# Patient Record
Sex: MALE | Race: WHITE | NOT HISPANIC OR LATINO | Employment: UNEMPLOYED | URBAN - NONMETROPOLITAN AREA
[De-identification: names, ages, dates, MRNs, and addresses within clinical notes are randomized per-mention and may not be internally consistent; named-entity substitution may affect disease eponyms.]

---

## 2017-07-14 ENCOUNTER — HOSPITAL ENCOUNTER (EMERGENCY)
Facility: HOSPITAL | Age: 27
Discharge: STILL ADMITTED OR ADMINISTRATIVE | End: 2017-07-14
Attending: EMERGENCY MEDICINE

## 2017-07-14 ENCOUNTER — HOSPITAL ENCOUNTER (INPATIENT)
Facility: HOSPITAL | Age: 27
LOS: 4 days | Discharge: HOME | End: 2017-07-18
Attending: PSYCHIATRY & NEUROLOGY | Admitting: PSYCHIATRY & NEUROLOGY

## 2017-07-14 VITALS
TEMPERATURE: 98.6 F | SYSTOLIC BLOOD PRESSURE: 126 MMHG | BODY MASS INDEX: 40.92 KG/M2 | RESPIRATION RATE: 17 BRPM | OXYGEN SATURATION: 96 % | DIASTOLIC BLOOD PRESSURE: 79 MMHG | HEIGHT: 68 IN | WEIGHT: 270 LBS | HEART RATE: 73 BPM

## 2017-07-14 DIAGNOSIS — R45.851 SUICIDAL IDEATION: Primary | ICD-10-CM

## 2017-07-14 PROBLEM — F32.9 MDD (MAJOR DEPRESSIVE DISORDER): Status: ACTIVE | Noted: 2017-07-14

## 2017-07-14 LAB
6-ACETYL MORPHINE: NEGATIVE
ALBUMIN SERPL-MCNC: 4.7 G/DL (ref 3.5–5)
ALBUMIN/GLOB SERPL: 1.6 G/DL (ref 1.5–2.5)
ALP SERPL-CCNC: 80 U/L (ref 40–129)
ALT SERPL W P-5'-P-CCNC: 19 U/L (ref 10–44)
AMPHET+METHAMPHET UR QL: NEGATIVE
ANION GAP SERPL CALCULATED.3IONS-SCNC: 3.7 MMOL/L (ref 3.6–11.2)
AST SERPL-CCNC: 23 U/L (ref 10–34)
BARBITURATES UR QL SCN: NEGATIVE
BASOPHILS # BLD AUTO: 0.02 10*3/MM3 (ref 0–0.3)
BASOPHILS NFR BLD AUTO: 0.2 % (ref 0–2)
BENZODIAZ UR QL SCN: NEGATIVE
BILIRUB SERPL-MCNC: 1 MG/DL (ref 0.2–1.8)
BILIRUB UR QL STRIP: NEGATIVE
BUN BLD-MCNC: 16 MG/DL (ref 7–21)
BUN/CREAT SERPL: 18 (ref 7–25)
BUPRENORPHINE SERPL-MCNC: NEGATIVE NG/ML
CALCIUM SPEC-SCNC: 10 MG/DL (ref 7.7–10)
CANNABINOIDS SERPL QL: POSITIVE
CHLORIDE SERPL-SCNC: 109 MMOL/L (ref 99–112)
CLARITY UR: CLEAR
CO2 SERPL-SCNC: 27.3 MMOL/L (ref 24.3–31.9)
COCAINE UR QL: NEGATIVE
COLOR UR: ABNORMAL
CREAT BLD-MCNC: 0.89 MG/DL (ref 0.43–1.29)
DEPRECATED RDW RBC AUTO: 39.5 FL (ref 37–54)
EOSINOPHIL # BLD AUTO: 0.26 10*3/MM3 (ref 0–0.7)
EOSINOPHIL NFR BLD AUTO: 3 % (ref 0–5)
ERYTHROCYTE [DISTWIDTH] IN BLOOD BY AUTOMATED COUNT: 12.7 % (ref 11.5–14.5)
ETHANOL BLD-MCNC: <10 MG/DL
ETHANOL UR QL: <0.01 %
GFR SERPL CREATININE-BSD FRML MDRD: 103 ML/MIN/1.73
GLOBULIN UR ELPH-MCNC: 3 GM/DL
GLUCOSE BLD-MCNC: 113 MG/DL (ref 70–110)
GLUCOSE UR STRIP-MCNC: NEGATIVE MG/DL
HCT VFR BLD AUTO: 44.1 % (ref 42–52)
HGB BLD-MCNC: 15.2 G/DL (ref 14–18)
HGB UR QL STRIP.AUTO: NEGATIVE
IMM GRANULOCYTES # BLD: 0.02 10*3/MM3 (ref 0–0.03)
IMM GRANULOCYTES NFR BLD: 0.2 % (ref 0–0.5)
KETONES UR QL STRIP: ABNORMAL
LEUKOCYTE ESTERASE UR QL STRIP.AUTO: NEGATIVE
LYMPHOCYTES # BLD AUTO: 2.28 10*3/MM3 (ref 1–3)
LYMPHOCYTES NFR BLD AUTO: 25.9 % (ref 21–51)
MCH RBC QN AUTO: 29.6 PG (ref 27–33)
MCHC RBC AUTO-ENTMCNC: 34.5 G/DL (ref 33–37)
MCV RBC AUTO: 86 FL (ref 80–94)
METHADONE UR QL SCN: NEGATIVE
MONOCYTES # BLD AUTO: 0.62 10*3/MM3 (ref 0.1–0.9)
MONOCYTES NFR BLD AUTO: 7 % (ref 0–10)
NEUTROPHILS # BLD AUTO: 5.61 10*3/MM3 (ref 1.4–6.5)
NEUTROPHILS NFR BLD AUTO: 63.7 % (ref 30–70)
NITRITE UR QL STRIP: NEGATIVE
OPIATES UR QL: NEGATIVE
OSMOLALITY SERPL CALC.SUM OF ELEC: 281.4 MOSM/KG (ref 273–305)
OXYCODONE UR QL SCN: NEGATIVE
PCP UR QL SCN: NEGATIVE
PH UR STRIP.AUTO: <=5 [PH] (ref 5–8)
PLATELET # BLD AUTO: 269 10*3/MM3 (ref 130–400)
PMV BLD AUTO: 9.7 FL (ref 6–10)
POTASSIUM BLD-SCNC: 4.2 MMOL/L (ref 3.5–5.3)
PROT SERPL-MCNC: 7.7 G/DL (ref 6–8)
PROT UR QL STRIP: NEGATIVE
RBC # BLD AUTO: 5.13 10*6/MM3 (ref 4.7–6.1)
SODIUM BLD-SCNC: 140 MMOL/L (ref 135–153)
SP GR UR STRIP: >1.03 (ref 1–1.03)
UROBILINOGEN UR QL STRIP: ABNORMAL
WBC NRBC COR # BLD: 8.81 10*3/MM3 (ref 4.5–12.5)

## 2017-07-14 RX ORDER — IBUPROFEN 400 MG/1
600 TABLET ORAL EVERY 6 HOURS PRN
Status: DISCONTINUED | OUTPATIENT
Start: 2017-07-14 | End: 2017-07-18 | Stop reason: HOSPADM

## 2017-07-14 RX ORDER — ALUMINA, MAGNESIA, AND SIMETHICONE 2400; 2400; 240 MG/30ML; MG/30ML; MG/30ML
15 SUSPENSION ORAL EVERY 6 HOURS PRN
Status: DISCONTINUED | OUTPATIENT
Start: 2017-07-14 | End: 2017-07-18 | Stop reason: HOSPADM

## 2017-07-14 RX ORDER — ONDANSETRON 4 MG/1
4 TABLET, FILM COATED ORAL EVERY 6 HOURS PRN
Status: DISCONTINUED | OUTPATIENT
Start: 2017-07-14 | End: 2017-07-18 | Stop reason: HOSPADM

## 2017-07-14 RX ORDER — ECHINACEA PURPUREA EXTRACT 125 MG
2 TABLET ORAL AS NEEDED
Status: DISCONTINUED | OUTPATIENT
Start: 2017-07-14 | End: 2017-07-18 | Stop reason: HOSPADM

## 2017-07-14 RX ORDER — FAMOTIDINE 20 MG/1
20 TABLET, FILM COATED ORAL 2 TIMES DAILY PRN
Status: DISCONTINUED | OUTPATIENT
Start: 2017-07-14 | End: 2017-07-18 | Stop reason: HOSPADM

## 2017-07-14 RX ORDER — LOPERAMIDE HYDROCHLORIDE 2 MG/1
2 CAPSULE ORAL 4 TIMES DAILY PRN
Status: DISCONTINUED | OUTPATIENT
Start: 2017-07-14 | End: 2017-07-18 | Stop reason: HOSPADM

## 2017-07-14 RX ORDER — TRAZODONE HYDROCHLORIDE 50 MG/1
50 TABLET ORAL NIGHTLY PRN
Status: DISCONTINUED | OUTPATIENT
Start: 2017-07-14 | End: 2017-07-18 | Stop reason: HOSPADM

## 2017-07-14 RX ORDER — HYDROXYZINE 50 MG/1
50 TABLET, FILM COATED ORAL EVERY 6 HOURS PRN
Status: DISCONTINUED | OUTPATIENT
Start: 2017-07-14 | End: 2017-07-18 | Stop reason: HOSPADM

## 2017-07-14 RX ORDER — BENZONATATE 100 MG/1
100 CAPSULE ORAL 3 TIMES DAILY PRN
Status: DISCONTINUED | OUTPATIENT
Start: 2017-07-14 | End: 2017-07-18 | Stop reason: HOSPADM

## 2017-07-14 RX ORDER — NICOTINE 21 MG/24HR
1 PATCH, TRANSDERMAL 24 HOURS TRANSDERMAL DAILY
Status: DISCONTINUED | OUTPATIENT
Start: 2017-07-14 | End: 2017-07-18 | Stop reason: HOSPADM

## 2017-07-14 RX ADMIN — HYDROXYZINE HYDROCHLORIDE 50 MG: 50 TABLET ORAL at 21:24

## 2017-07-14 RX ADMIN — TRAZODONE HYDROCHLORIDE 50 MG: 50 TABLET ORAL at 22:47

## 2017-07-14 RX ADMIN — NICOTINE 1 PATCH: 21 PATCH TRANSDERMAL at 21:24

## 2017-07-14 NOTE — NURSING NOTE
Patient reported during assessment that he punched a wall with his fist yesterday. ED provider notified and instructed that he may want to look at his hand. No pain reported or swelling noted.

## 2017-07-14 NOTE — NURSING NOTE
Intake assessment completed. Patient reports that he has been struggling with depression for about 5 months now and it has been real bad the last two months.Her recently [place] to [place] hoping it would help but it is no better.  Patient reports that he just feels angry all the time and his mind just keeps racing with bad thgoughts like his wife is cheating on him, and crazy stuff like that. Patient says that the last two weeks it is so bad that he is not sleeping, is going off on people verbally for no reason and feels paranoid, depressed and suicidal. When asked if he had a plan the patient says all kinds of thoughts, but I really thought about getting a gun and use it. He reports that he is just so angry and does not know why. Today he decided it is effecting his family and his life at home and needs help before its too late. He also says he think he is bipolar and needs to be checked. Anxiety and depression rated a 9/10. Emotional support provided to pt.

## 2017-07-14 NOTE — NURSING NOTE
Called and provided intake information via phone. Instructed to admit the patient. Admit orders received. RBVOx2. Patient precautions maintained. Pt and ED provider made aware of plan of care.

## 2017-07-14 NOTE — NURSING NOTE
Patient to intake per ED staff.  Pockets emptied and through search complete.  Patient searched by intake nurse and witnessed (Security Dilshad) per ED Policy 100.  Belongings logged on Personal Belongings Inventory Sheet.  Patient placed in hospital attire.  Room swept for any potential safety hazards, room cleared, and patient placed in treatment room.  Patient ready for evaluation.

## 2017-07-14 NOTE — ED PROVIDER NOTES
Subjective   Patient is a 26 y.o. male presenting with mental health disorder.   History provided by:  Patient   used: No    Mental Health Problem   Presenting symptoms: agitation and suicidal thoughts    Degree of incapacity (severity):  Moderate  Onset quality:  Sudden  Duration:  1 day  Timing:  Constant  Progression:  Worsening  Chronicity:  New  Context: not drug abuse, not noncompliant and not recent medication change    Treatment compliance:  Untreated  Time since last psychoactive medication taken:  1 day  Relieved by:  Nothing  Worsened by:  Nothing  Ineffective treatments:  None tried  Associated symptoms: no abdominal pain, no anhedonia, no anxiety, no decreased need for sleep, not distractible, no feelings of worthlessness, no headaches, no hypersomnia, no irritability and no poor judgment        Review of Systems   Constitutional: Negative for irritability.   Gastrointestinal: Negative for abdominal pain.   Skin: Negative for pallor and rash.   Neurological: Negative for headaches.   Psychiatric/Behavioral: Positive for agitation and suicidal ideas. The patient is not nervous/anxious.    All other systems reviewed and are negative.      Past Medical History:   Diagnosis Date   • Anger    • Depression        No Known Allergies    Past Surgical History:   Procedure Laterality Date   • APPENDECTOMY     • CHOLECYSTECTOMY         Family History   Problem Relation Age of Onset   • Family history unknown: Yes       Social History     Social History   • Marital status: Unknown     Spouse name: N/A   • Number of children: N/A   • Years of education: N/A     Social History Main Topics   • Smoking status: Current Every Day Smoker     Packs/day: 1.00     Years: 12.00   • Smokeless tobacco: None   • Alcohol use No   • Drug use: No   • Sexual activity: Yes     Partners: Female      Comment: wife is on mirana     Other Topics Concern   • None     Social History Narrative   • None  "          Objective   Physical Exam   Constitutional: He appears well-developed and well-nourished. No distress.   HENT:   Head: Normocephalic.   Right Ear: External ear normal.   Left Ear: External ear normal.   Nose: Nose normal.   Mouth/Throat: Oropharynx is clear and moist.   Eyes: Conjunctivae and EOM are normal. Pupils are equal, round, and reactive to light. Right eye exhibits no discharge. Left eye exhibits no discharge.   Neck: Normal range of motion. Neck supple. No JVD present. No tracheal deviation present. No thyromegaly present.   Cardiovascular: Normal rate, regular rhythm, normal heart sounds and intact distal pulses.    Pulmonary/Chest: Effort normal and breath sounds normal. No stridor. No respiratory distress. He has no wheezes.   Abdominal: Soft. Bowel sounds are normal. He exhibits no distension. There is no tenderness.   Musculoskeletal: Normal range of motion.   Neurological: He is alert.   Skin: Skin is warm and dry. He is not diaphoretic.   Psychiatric: He has a normal mood and affect. His behavior is normal. Judgment and thought content normal.   Nursing note and vitals reviewed.      Procedures         ED Course  ED Course   Comment By Time   26-year-old male comes in complaining of \"suicidal ideation\"X 1 day.  Patient denies any specific plan.  Patient denies any alcohol, drug abuse.  Patient denies any other associated medical problems at this time. Enrrique Mart PA-C 07/14 1817   Endorsed to Inga Mart PA-C 07/14 1952                  MDM  Number of Diagnoses or Management Options  Suicidal ideation: new and requires workup  Risk of Complications, Morbidity, and/or Mortality  Presenting problems: moderate  Diagnostic procedures: moderate  Management options: moderate    Patient Progress  Patient progress: stable      Final diagnoses:   Suicidal ideation            Enrrique Mart PA-C  07/14/17 1959       Enrrique Mart PA-C  07/14/17 1959    "

## 2017-07-15 LAB — GLUCOSE BLDC GLUCOMTR-MCNC: 137 MG/DL (ref 70–130)

## 2017-07-15 PROCEDURE — 99223 1ST HOSP IP/OBS HIGH 75: CPT | Performed by: PSYCHIATRY & NEUROLOGY

## 2017-07-15 RX ORDER — CITALOPRAM 20 MG/1
10 TABLET ORAL DAILY
Status: DISCONTINUED | OUTPATIENT
Start: 2017-07-15 | End: 2017-07-16

## 2017-07-15 RX ADMIN — CITALOPRAM HYDROBROMIDE 10 MG: 20 TABLET ORAL at 17:11

## 2017-07-15 RX ADMIN — TRAZODONE HYDROCHLORIDE 50 MG: 50 TABLET ORAL at 21:51
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
## 2017-07-15 NOTE — PLAN OF CARE
Problem: [facility] Patient Care Overview (Adult)  Goal: Plan of Care Review  Outcome: Ongoing (interventions implemented as appropriate)  Patient very cooperative with care, out to dayroom most of this shift and patient showered. Patient reports anxiety and depression both at 6 on 0-10 scale with no thoughts to harm self noted. Patient reports he is feeling better and feels he is getting his depression under control. Patient reports missing his wife and kids and is requesting to be discharged in the morning. Patient reports good sleep and good appetite.  Will continue to monitor.    07/15/17 1503   Coping/Psychosocial Response Interventions   Plan Of Care Reviewed With patient   Coping/Psychosocial   Patient Agreement with Plan of Care agrees   Patient Care Overview   Progress improving       Goal: Interdisciplinary Rounds/Family Conference  Outcome: Ongoing (interventions implemented as appropriate)  Goal: Individualization and Mutuality  Outcome: Ongoing (interventions implemented as appropriate)  Goal: Discharge Needs Assessment  Outcome: Ongoing (interventions implemented as appropriate)    Problem:  Overarching Goals  Goal: Adheres to Safety Considerations for Self and Others  Outcome: Ongoing (interventions implemented as appropriate)  Goal: Optimized Coping Skills in Response to Life Stressors  Outcome: Ongoing (interventions implemented as appropriate)  Goal: Develops/Participates in Therapeutic [place] to Support Successful Transition  Outcome: Ongoing (interventions implemented as appropriate)

## 2017-07-15 NOTE — H&P
"Heavenly Dowd RN  Admission Date: 7/14/2017  10:06 AM 07/15/17    Kevan Lakhani, 26 y.o. Male  Subjective   \"I need help before its too late    Chief Complaint:  Increased Depression, Suicidal Ideation, Paranoia, Increased Aggression    HPI:  Kevan Lakhani is a 26 y.o. male who was admitted for complaints of Increased Depression, Suicidal Ideation, Paranoia, and Increased Aggression.  Patient states he has been experiencing feelings of depression for the past 5 months however reports these feelings has intensified over the last 2 months.  He reports increased anger and agitation and had reportedly punched a wall prior to presenting to [facility] ED for evaluation.  Prior to the patient's arrival, he decided this is effecting his family and his life and he needs help before its too late. He admits to having suicidal thoughts recently and shared he could think of all kinds of ways to kill himself.  He admits to getting a gun and \"using it\".  Patient reports racing thoughts and paranoia thinking that his wife is \"cheating on him\".  He states over the past 2 weeks his symptoms have got worse and he has been unable to sleep.  He states that he \"goes off on people\" for no reason and that he is just so angry but doesn't know why.  He reports his marriage is strained due to his anger and depression stating increased arguing with his wife.  His wife told him that if he did not get help that she would leave him.  He denies homicidal ideations or hallucinations.  He states his appetite has been good. He seems somewhat guarded and vague with some questions.  Per note, the patient moved from [place] to [place] 3 months ago and when asked why he relocated, he stated, \"just to get away\".  During my assessment, he appeared cooperative and somewhat restless.  He states he feels better than yesterday.  He also shares that he has crying spells at times. The patient had also previously stated that he believed that he is Bipolar. Screening " "questions do not reveal a history of true jaime or hypomania.  He denies any previous psychiatric hospitalizations or past diagnosis of mental illness.  The patient has been admitted to the Adult Psychiatric Unit for crisis intervention, safety, and stabilization of symptoms.   No h/o jaime or hypomania based on extensive screening questions posed during this interview today.    Past Psych History: He denies any previous psychiatric hospitalizations, outpatient treatment, or past suicide attempts.      Substance Abuse: UDS is negative.  He denies any illicit drug or alcohol use and smokes 1 pack of cigarettes per day.    Family History:  Family history is unknown by patient.    Personal and social history:  Born and raised in [place], the patient reports recently moving to [place] 3 months ago \"just to get away\".   He has an 11th grade education, is unemployed, and reports he receives disability due to a learning disability.  The patient has been  for the past 7 years and has 2 children, ages 5 and 2.  He denies any history of abuse as a child.  He denies any prior arrests or current legal issues.    Medical/Surgical History:  Denies history of seizures in the past.    Past Medical History:   Diagnosis Date   • Anger    • Depression      Past Surgical History:   Procedure Laterality Date   • APPENDECTOMY     • CHOLECYSTECTOMY         No Known Allergies  Social History   Substance Use Topics   • Smoking status: Current Every Day Smoker     Packs/day: 1.00     Years: 12.00   • Smokeless tobacco: None   • Alcohol use No     Current Medications:   Current Facility-Administered Medications   Medication Dose Route Frequency Provider Last Rate Last Dose   • aluminum-magnesium hydroxide-simethicone (MAALOX MAX) 400-400-40 MG/5ML suspension 15 mL  15 mL Oral Q6H PRN Mayco Degroot MD       • benzonatate (TESSALON) capsule 100 mg  100 mg Oral TID PRN Mayco Degroot MD       • famotidine (PEPCID) tablet 20 mg  20 mg " Oral BID PRN Mayco Degroot MD       • hydrOXYzine (ATARAX) tablet 50 mg  50 mg Oral Q6H PRN Mayco Degroot MD   50 mg at 07/14/17 2124   • ibuprofen (ADVIL,MOTRIN) tablet 600 mg  600 mg Oral Q6H PRN Mayco Degroot MD       • loperamide (IMODIUM) capsule 2 mg  2 mg Oral 4x Daily PRN Mayco Degroot MD       • magnesium hydroxide (MILK OF MAGNESIA) suspension 2400 mg/10mL 10 mL  10 mL Oral Daily PRN Mayco Degroot MD       • nicotine (NICODERM CQ) 21 MG/24HR patch 1 patch  1 patch Transdermal Daily Mayco Degroot MD   1 patch at 07/14/17 2124   • ondansetron (ZOFRAN) tablet 4 mg  4 mg Oral Q6H PRN Mayco Degroot MD       • sodium chloride (OCEAN) nasal spray 2 spray  2 spray Each Nare PRN Mayco Degroot MD       • traZODone (DESYREL) tablet 50 mg  50 mg Oral Nightly PRN Mayco Degroot MD   50 mg at 07/14/17 2247       Review of Systems    Review of Systems - General ROS: negative for - chills, fever or malaise  Ophthalmic ROS: negative for - loss of vision  ENT ROS: negative for - hearing change  Allergy and Immunology ROS: negative for - hives  Hematological and Lymphatic ROS: negative for - bleeding problems  Endocrine ROS: negative for - skin changes  Respiratory ROS: no cough, shortness of breath, or wheezing  Cardiovascular ROS: no chest pain or dyspnea on exertion  Gastrointestinal ROS: no abdominal pain, change in bowel habits, or black or bloody stools  Genito-Urinary ROS: no dysuria, trouble voiding, or hematuria  Musculoskeletal ROS: negative for - gait disturbance  Neurological ROS: no TIA or stroke symptoms  Dermatological ROS: negative for rash    Objective       General Appearance:    Alert, cooperative, in no acute distress   Head:    Normocephalic, without obvious abnormality, atraumatic   Eyes:            Lids and lashes normal, conjunctivae and sclerae normal, no   icterus, no pallor, corneas clear, PERRLA   Ears:    Ears appear intact with no abnormalities noted   Throat:   No oral  "lesions, no thrush, oral mucosa moist   Neck:   No adenopathy, supple, trachea midline, no thyromegaly, no   carotid bruit, no JVD   Back:     No kyphosis present, no scoliosis present, no skin lesions,      erythema or scars, no tenderness to percussion or                   palpation,   range of motion normal   Lungs:     Clear to auscultation,respirations regular, even and                  unlabored    Heart:    Regular rhythm and normal rate, normal S1 and S2, no            murmur, no gallop, no rub, no click   Chest Wall:    No abnormalities observed   Abdomen:     Normal bowel sounds, no masses, no organomegaly, soft        non-tender, non-distended, no guarding, no rebound                tenderness   Rectal:     Deferred   Extremities:   Moves all extremities well, no edema, no cyanosis, no             redness   Pulses:   Pulses palpable and equal bilaterally   Skin:   No bleeding, bruising or rash   Lymph nodes:   No palpable adenopathy   Neurologic:   Cranial nerves 2 - 12 grossly intact, sensation intact, DTR       present and equal bilaterally       Blood pressure 123/82, pulse 52, temperature 97.7 °F (36.5 °C), temperature source Axillary, resp. rate 18, height 68\" (172.7 cm), weight 271 lb 6.4 oz (123 kg), SpO2 97 %.    Mental Status Exam:   Hygiene:   fair  Cooperation:  Guarded  Eye Contact:  Fair  Psychomotor Behavior:  Restless  Affect:  Restricted  Hopelessness: 8  Speech:  Minimal and Pressured  Thought Process:  Linear and Pressured  Thought Content:  Mood congurent  Suicidal:  Suicidal Ideation  Homicidal:  None  Hallucinations:  None  Delusion:  Paranoid  Memory:  Intact  Orientation:  Person, Place and Situation  Reliability:  poor  Insight:  Fair  Judgement:  Poor  Impulse Control:  Poor  Physical/Medical Issues:  No     Medical Decision Making:              Labs:       Results for AMIE CANDELARIA (MRN [id number]) as of 7/15/2017 10:07   Ref. Range 7/14/2017 17:55 7/14/2017 17:59   Glucose " Latest Ref Range: 70 - 110 mg/dL 113 (H)    Sodium Latest Ref Range: 135 - 153 mmol/L 140    Potassium Latest Ref Range: 3.5 - 5.3 mmol/L 4.2    CO2 Latest Ref Range: 24.3 - 31.9 mmol/L 27.3    Chloride Latest Ref Range: 99 - 112 mmol/L 109    Anion Gap Latest Ref Range: 3.6 - 11.2 mmol/L 3.7    Creatinine Latest Ref Range: 0.43 - 1.29 mg/dL 0.89    BUN Latest Ref Range: 7 - 21 mg/dL 16    BUN/Creatinine Ratio Latest Ref Range: 7.0 - 25.0  18.0    Calcium Latest Ref Range: 7.7 - 10.0 mg/dL 10.0    eGFR Non African Amer Latest Ref Range: >60 mL/min/1.73 103    Alkaline Phosphatase Latest Ref Range: 40 - 129 U/L 80    Total Protein Latest Ref Range: 6.0 - 8.0 g/dL 7.7    ALT (SGPT) Latest Ref Range: 10 - 44 U/L 19    AST (SGOT) Latest Ref Range: 10 - 34 U/L 23    Total Bilirubin Latest Ref Range: 0.2 - 1.8 mg/dL 1.0    Albumin Latest Ref Range: 3.50 - 5.00 g/dL 4.70    Globulin Latest Units: gm/dL 3.0    A/G Ratio Latest Ref Range: 1.5 - 2.5 g/dL 1.6    WBC Latest Ref Range: 4.50 - 12.50 10*3/mm3 8.81    RBC Latest Ref Range: 4.70 - 6.10 10*6/mm3 5.13    Hemoglobin Latest Ref Range: 14.0 - 18.0 g/dL 15.2    Hematocrit Latest Ref Range: 42.0 - 52.0 % 44.1    RDW Latest Ref Range: 11.5 - 14.5 % 12.7    MCV Latest Ref Range: 80.0 - 94.0 fL 86.0    MCH Latest Ref Range: 27.0 - 33.0 pg 29.6    MCHC Latest Ref Range: 33.0 - 37.0 g/dL 34.5    MPV Latest Ref Range: 6.0 - 10.0 fL 9.7    Platelets Latest Ref Range: 130 - 400 10*3/mm3 269    RDW-SD Latest Ref Range: 37.0 - 54.0 fl 39.5    Neutrophil % Latest Ref Range: 30.0 - 70.0 % 63.7    Lymphocyte % Latest Ref Range: 21.0 - 51.0 % 25.9    Monocyte % Latest Ref Range: 0.0 - 10.0 % 7.0    Eosinophil % Latest Ref Range: 0.0 - 5.0 % 3.0    Basophil % Latest Ref Range: 0.0 - 2.0 % 0.2    Immature Grans % Latest Ref Range: 0.0 - 0.5 % 0.2    Neutrophils, Absolute Latest Ref Range: 1.40 - 6.50 10*3/mm3 5.61    Lymphocytes, Absolute Latest Ref Range: 1.00 - 3.00 10*3/mm3 2.28     Monocytes, Absolute Latest Ref Range: 0.10 - 0.90 10*3/mm3 0.62    Eosinophils, Absolute Latest Ref Range: 0.00 - 0.70 10*3/mm3 0.26    Basophils, Absolute Latest Ref Range: 0.00 - 0.30 10*3/mm3 0.02    Immature Grans, Absolute Latest Ref Range: 0.00 - 0.03 10*3/mm3 0.02    Color, UA Latest Ref Range: Yellow, Straw   Dark Yellow (A)   Appearance, UA Latest Ref Range: Clear   Clear   Specific Sunflower, UA Latest Ref Range: 1.005 - 1.030   >1.030 (H)   pH, UA Latest Ref Range: 5.0 - 8.0   <=5.0   Glucose, UA Latest Ref Range: Negative   Negative   Ketones, UA Latest Ref Range: Negative   Trace (A)   Blood, UA Latest Ref Range: Negative   Negative   Nitrite, UA Latest Ref Range: Negative   Negative   Leuk Esterase, UA Latest Ref Range: Negative   Negative   Protein, UA Latest Ref Range: Negative   Negative   Bilirubin, UA Latest Ref Range: Negative   Negative   Urobilinogen, UA Latest Ref Range: 0.2 - 1.0 E.U./dL   0.2 E.U./dL   Ethanol % Latest Units: % <0.010    Ethanol Latest Ref Range: <=10 mg/dL <10    Amphetamine Screen, Urine Latest Ref Range: Negative   Negative   Barbiturates Screen, Urine Latest Ref Range: Negative   Negative   Benzodiazepine Screen, Urine Latest Ref Range: Negative   Negative   Buprenorphine, Screen, Urine Latest Ref Range: Negative   Negative   Cocaine Screen, Urine Latest Ref Range: Negative   Negative   Methadone Screen , Urine Latest Ref Range: Negative   Negative   Opiate Screen, Urine Latest Ref Range: Negative   Negative   Oxycodone Screen, Urine Latest Ref Range: Negative   Negative   Phencyclidine (PCP), Urine Latest Ref Range: Negative   Negative   THC Screen, Urine Latest Ref Range: Negative   Positive (A)   6-ACETYL MORPHINE Latest Ref Range: Negative   Negative   Osmolality Calc Latest Ref Range: 273.0 - 305.0 mOsm/kg 281.4               Medications:                nicotine 1 patch Transdermal Daily                  •  aluminum-magnesium hydroxide-simethicone  •  benzonatate  •   famotidine  •  hydrOXYzine  •  ibuprofen  •  loperamide  •  magnesium hydroxide  •  ondansetron  •  sodium chloride  •  traZODone   All medications reviewed.    Special Precautions: Continue current level of Special Precautions.            Assessment/Plan    Monitor and treat in a safe and secure environment.       Patient Active Problem List   Diagnosis Code   • MDD (major depressive disorder) F32.9   Rule Out: OCD, EARLE - I suspect he could be struggling with OCD or EARLE based on his reporting today.  Deserves further investigation and treatment long-term.    The patient has been admitted to the [facility] for safety and symptom stabilization. The patient has been given routine orders and placed on special precautions. The patient will be assigned a Master Level Therapist. Dr. GARRET Degroot will assess the patient daily and work with the treatment team to develop a plan of care.       Discussed risks, benefits, and side effects of the above medication and the patient was agreeable with the plan.             Attestation:  I, Heavenly Dowd RN acted as scribe for Dr. GARRET Degroot.                Physician Attestation: I attest that the above note accurately reflects work and decisions made by me.

## 2017-07-15 NOTE — PLAN OF CARE
Problem: BH Patient Care Overview (Adult)  Goal: Plan of Care Review  Outcome: Ongoing (interventions implemented as appropriate)    07/14/17 9916   Coping/Psychosocial Response Interventions   Plan Of Care Reviewed With patient   Coping/Psychosocial   Patient Agreement with Plan of Care agrees   Patient Care Overview   Progress no change   Outcome Evaluation   Outcome Summary/Follow up Plan New Admit
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
## 2017-07-15 NOTE — PROGRESS NOTES
Attempted to contact the patient's mother, Melisa, at telephone numbers [phone] and [phone].  She was not reachable at either of these numbers, and a message was left at each number requesting callback.  The purpose of the therapist call was to gather collateral information and facilitating safety planning.  Depending on the results of this conversation, the patient might be able to return home as early as tomorrow.  The therapist also hoped to speak with the patient's spouse, Sherrie, at this time.

## 2017-07-16 PROCEDURE — 99232 SBSQ HOSP IP/OBS MODERATE 35: CPT | Performed by: PSYCHIATRY & NEUROLOGY

## 2017-07-16 RX ORDER — CITALOPRAM 20 MG/1
20 TABLET ORAL DAILY
Status: DISCONTINUED | OUTPATIENT
Start: 2017-07-17 | End: 2017-07-18 | Stop reason: HOSPADM

## 2017-07-16 RX ADMIN — HYDROXYZINE HYDROCHLORIDE 50 MG: 50 TABLET ORAL at 20:56

## 2017-07-16 RX ADMIN — CITALOPRAM HYDROBROMIDE 10 MG: 20 TABLET ORAL at 08:30

## 2017-07-16 RX ADMIN — TRAZODONE HYDROCHLORIDE 50 MG: 50 TABLET ORAL at 22:01

## 2017-07-16 NOTE — PLAN OF CARE
Problem:  Patient Care Overview (Adult)  Goal: Plan of Care Review  Outcome: Ongoing (interventions implemented as appropriate)    07/16/17 0335   Coping/Psychosocial Response Interventions   Plan Of Care Reviewed With patient   Coping/Psychosocial   Patient Agreement with Plan of Care agrees   Patient Care Overview   Progress improving   Outcome Evaluation   Outcome Summary/Follow up Plan Pt calm and cooperative this hsift. Rates anxiety and depression both 1/10. Denies SI/HI and SUSAN. Pt states he is feeling much better.

## 2017-07-16 NOTE — PLAN OF CARE
Problem:  Patient Care Overview (Adult)  Goal: Plan of Care Review    07/16/17 1801   Coping/Psychosocial Response Interventions   Plan Of Care Reviewed With patient   Coping/Psychosocial   Patient Agreement with Plan of Care agrees   Patient Care Overview   Progress improving   Outcome Evaluation   Outcome Summary/Follow up Plan Pt has no c/o, denies SI/HI and hallucinations. Reports he's waiting on the doctor to see if he gets to go home today. Pt reports sleep and appetite good and meds helping. Pt somewhat interactive with peers-talkative and watches tv with a male peer on the unit. Pt angry and cursing after seein the doctor, requesting to call his mom. Pt reports his mom lied about him having a gun and trying to kill himself. Pt was able to talk to Alanis the therapist, and calmed down.

## 2017-07-16 NOTE — PROGRESS NOTES
"      PROGRESS NOTE  Clinician: Mayco Degroot MD  Admission Date: 7/14/2017  1:03 PM 07/16/17    Behavioral Health Treatment Plan and Problem List: I have reviewed and approved the Behavioral Health Treatment Plan and Problem list.    Allergies  No Known Allergies    Hospital Day: 2 days      Assessment completed within view of staff    History  CC: inpatient followup  Interval HPI: Patient seen and evaluated by me.  Chart reviewed.  I discussed the contents of therapist's note (collateral obtained from patient's mother) with patient - he denied and minimized most of the events, demonstrating poor insight into the seriousness of his suicidal threats and suicidal ideation.  ROS negative as below.  Tolerating Celexa okay.    Interval Review of Systems:   General ROS: negative for - fever or malaise  Endocrine ROS: negative for - palpitations  Respiratory ROS: no cough, shortness of breath, or wheezing  Cardiovascular ROS: no chest pain or dyspnea on exertion  Gastrointestinal ROS: no abdominal pain,no black or bloody stools    /77 (BP Location: Right arm, Patient Position: Lying)  Pulse 50  Temp 97.6 °F (36.4 °C) (Temporal Artery )   Resp 20  Ht 68\" (172.7 cm)  Wt 271 lb 6.4 oz (123 kg)  SpO2 99%  BMI 41.27 kg/m2    Mental Status Exam  Mood/Affect: evasive  Thought Processes:  evasive  Thought Content:  normal  Suicidal Thoughts:  denies  Suicidal Plan/Intent: denies  Homicidal Thoughts:  absent        Medical Decision Making:   Labs:     Lab Results (last 24 hours)     ** No results found for the last 24 hours. **            Radiology:     Imaging Results (last 24 hours)     ** No results found for the last 24 hours. **            EKG:     ECG/EMG Results (most recent)     None           Medications:     citalopram 10 mg Oral Daily   nicotine 1 patch Transdermal Daily          All medications reviewed.    Special Precautions: Continue current level of Special Precautions.    Assessment/Diagnosis: "   Patient Active Problem List   Diagnosis Code   • MDD (major depressive disorder) F32.9     Treatment Plan: Continue current treatment plan.Continue hospitalization for safety.  Increase celexa to 20mg daily.    Attestation:   Physician Attestation: I attest that the above note accurately reflects work and decisions made by me.

## 2017-07-17 PROCEDURE — 99232 SBSQ HOSP IP/OBS MODERATE 35: CPT | Performed by: PSYCHIATRY & NEUROLOGY

## 2017-07-17 RX ADMIN — CITALOPRAM HYDROBROMIDE 20 MG: 20 TABLET ORAL at 08:32

## 2017-07-17 NOTE — PLAN OF CARE
Problem:  Patient Care Overview (Adult)  Goal: Interdisciplinary Rounds/Family Conference  Outcome: Ongoing (interventions implemented as appropriate)    07/17/17 8399   Interdisciplinary Rounds/Family Conf   Summary Individual session    Participants patient;social work      D: Therapist met with patient on this date for individual to discuss patient needs and treatment progress.  Patient reports overall improvement in symptoms that led to hospitalization.  Reports having history of anger management issues and low self-esteem.  He reports he was hopeful for discharge home today but understands Dr. Degroot wanted him to stay until tomorrow.  He discussed being eager to see his 2 sons and his wife for discharge.  He reports he hopes he has not [place] repairable damaged his relationship with his wife to work on as well.  He discussed he felt he needed outpatient treatment with a therapist for several years and is agreeable to follow-up with [facility] for outpatient treatment at discharge.  Patient signed consent.  Patient expressed he hopes to finish his GED soon and get a good job.  Expressed he is on SSI since a child but that is not enough income for them to live on and wants to make more money.     A: Patient mood and affect were euthymic.  Patient denies SI/HI/AVH.     P: Patient remains hospitalized for safety and stabilization.  Patient has agreed to follow up with [facility] and signed consent.  Patient will likely discharge tomorrow.

## 2017-07-17 NOTE — PROGRESS NOTES
"      PROGRESS NOTE  Clinician: Mayco Degroot MD  Admission Date: 7/14/2017  9:25 AM 07/17/17    Behavioral Health Treatment Plan and Problem List: I have reviewed and approved the Behavioral Health Treatment Plan and Problem list.    Allergies  No Known Allergies    Hospital Day: 3 days      Assessment completed within view of staff    History  CC: inpatient followup  Interval HPI: Patient seen and evaluated by me.  Chart reviewed.  He denies SI today.  Tolerating medications okay.  ROS negative as below.  He opened up to me today for the first time.  He stated that his behaviors in the days prior to admission were primarily driven out of a need for attention from his family members and his own difficulty regulating his anger.  He says that he was very upset at himself for creating a situation in which his wife wanted to leave him.  He says he's been verbally abusive to his wife, and because of his anxiety, has constantly been accusing her of cheating on him.  He's also been very angry towards her and angry in general.  It all came to head apparently on Thursday when she said that she was going to leave him.  Says that that is what generated his impulsive behaviors that led to this admission.    Interval Review of Systems:   General ROS: negative for - fever or malaise  Endocrine ROS: negative for - palpitations  Respiratory ROS: no cough, shortness of breath, or wheezing  Cardiovascular ROS: no chest pain or dyspnea on exertion  Gastrointestinal ROS: no abdominal pain,no black or bloody stools    /70 (BP Location: Right arm, Patient Position: Lying)  Pulse 57  Temp 97.9 °F (36.6 °C) (Temporal Artery )   Resp 20  Ht 68\" (172.7 cm)  Wt 271 lb 6.4 oz (123 kg)  SpO2 99%  BMI 41.27 kg/m2    Mental Status Exam  Mood/Affect:  \"I feel okay\"  Thought Processes:  linear, logical, and goal directed  Thought Content:  normal  Suicidal Thoughts:  denies  Suicidal Plan/Intent: none  Homicidal Thoughts:  " absent        Medical Decision Making:   Labs:     Lab Results (last 24 hours)     ** No results found for the last 24 hours. **            Radiology:     Imaging Results (last 24 hours)     ** No results found for the last 24 hours. **            EKG:     ECG/EMG Results (most recent)     None           Medications:     citalopram 20 mg Oral Daily   nicotine 1 patch Transdermal Daily          All medications reviewed.    Special Precautions: Continue current level of Special Precautions.    Assessment/Diagnosis:   Patient Active Problem List   Diagnosis Code   • MDD (major depressive disorder) F32.9     Treatment Plan: Continue current treatment plan.  Patient has been pushing to leave AGAINST MEDICAL ADVICE.  I am not comfortable with this until we have a clear plan for disposition and follow-up care.  It was only just today that he began to open up and develop some insight into his behaviors.  We'll work on developing plans for follow-up care with the therapist today.  Monitor for stability on the unit.  Consider possible discharge in the morning if he continues to demonstrate lack of suicidal ideation.    Attestation:   Physician Attestation: I attest that the above note accurately reflects work and decisions made by me.

## 2017-07-17 NOTE — PLAN OF CARE
Problem:  Patient Care Overview (Adult)  Goal: Plan of Care Review  Outcome: Ongoing (interventions implemented as appropriate)    07/17/17 1535   Coping/Psychosocial Response Interventions   Plan Of Care Reviewed With patient   Coping/Psychosocial   Patient Agreement with Plan of Care agrees   Patient Care Overview   Progress improving   Outcome Evaluation   Outcome Summary/Follow up Plan THE CLIENT DENIED ANXIETY, DEPRESSION, HI, SI, AND HALLUCINATIONS DURING ASSESSMENT. NO OUTBURSTS OR BEHAVIOR ISSUES DURING THIS SHIFT, HE INTERACTS APPROPRIATELY WITH STAFF AND PEERS.         Problem:  Overarching Goals  Goal: Adheres to Safety Considerations for Self and Others  Outcome: Ongoing (interventions implemented as appropriate)  Goal: Optimized Coping Skills in Response to Life Stressors  Outcome: Ongoing (interventions implemented as appropriate)  Goal: Develops/Participates in Therapeutic [place] to Support Successful Transition  Outcome: Ongoing (interventions implemented as appropriate)

## 2017-07-17 NOTE — PLAN OF CARE
Problem:  Patient Care Overview (Adult)  Goal: Plan of Care Review  Outcome: Ongoing (interventions implemented as appropriate)    07/17/17 0454   Coping/Psychosocial Response Interventions   Plan Of Care Reviewed With patient   Coping/Psychosocial   Patient Agreement with Plan of Care agrees   Patient Care Overview   Progress improving   Outcome Evaluation   Outcome Summary/Follow up Plan Pt calm and cooperative this shift. Denies anxiety and depression. Denies SI/HI and SUSAN. Pt states he is feeling much better and is ready for discharge.

## 2017-07-18 VITALS
BODY MASS INDEX: 41.13 KG/M2 | TEMPERATURE: 98 F | DIASTOLIC BLOOD PRESSURE: 79 MMHG | HEIGHT: 68 IN | WEIGHT: 271.4 LBS | SYSTOLIC BLOOD PRESSURE: 122 MMHG | RESPIRATION RATE: 18 BRPM | OXYGEN SATURATION: 94 % | HEART RATE: 52 BPM

## 2017-07-18 PROCEDURE — 99238 HOSP IP/OBS DSCHRG MGMT 30/<: CPT | Performed by: PSYCHIATRY & NEUROLOGY

## 2017-07-18 RX ORDER — TRAZODONE HYDROCHLORIDE 50 MG/1
50 TABLET ORAL NIGHTLY PRN
Qty: 30 TABLET | Refills: 0 | Status: SHIPPED | OUTPATIENT
Start: 2017-07-18

## 2017-07-18 RX ORDER — CITALOPRAM 20 MG/1
20 TABLET ORAL DAILY
Qty: 30 TABLET | Refills: 0 | Status: SHIPPED | OUTPATIENT
Start: 2017-07-18

## 2017-07-18 RX ADMIN — CITALOPRAM HYDROBROMIDE 20 MG: 20 TABLET ORAL at 08:39

## 2017-07-18 RX ADMIN — NICOTINE 1 PATCH: 21 PATCH TRANSDERMAL at 08:38

## 2017-07-18 NOTE — PROGRESS NOTES
"      PROGRESS NOTE  Clinician: Mayco Degroot MD  Admission Date: 7/14/2017  8:59 AM 07/18/17    Behavioral Health Treatment Plan and Problem List: I have reviewed and approved the Behavioral Health Treatment Plan and Problem list.    Allergies  No Known Allergies    Hospital Day: 4 days      Assessment completed within view of staff    History  CC: inpatient followup  Interval HPI: Patient seen and evaluated by me.  Chart reviewed. He has responded to treatment.  Discussing his problems openly now and demonstrating better insight. Therapist has made arrangements for followup care. He is feeling well this morning.  Denies SI.  No issues reported by staff overnight. Interacting with staff and peers appropriately.  Demonstrating good control of affect and impulses on the unit.  He is requesting to go home.    Interval Review of Systems:   General ROS: negative for - fever or malaise  Endocrine ROS: negative for - palpitations  Respiratory ROS: no cough, shortness of breath, or wheezing  Cardiovascular ROS: no chest pain or dyspnea on exertion  Gastrointestinal ROS: no abdominal pain,no black or bloody stools    /79 (BP Location: Left arm, Patient Position: Lying)  Pulse 52  Temp 98 °F (36.7 °C) (Temporal Artery )   Resp 18  Ht 68\" (172.7 cm)  Wt 271 lb 6.4 oz (123 kg)  SpO2 94%  BMI 41.27 kg/m2    Mental Status Exam  Mood/Affect: euthymic  Thought Processes:  linear, logical, and goal directed  Thought Content:  normal  Suicidal Thoughts:  none  Suicidal Plan/Intent: none  Homicidal Thoughts:  absent        Medical Decision Making:   Labs:     Lab Results (last 24 hours)     ** No results found for the last 24 hours. **            Radiology:     Imaging Results (last 24 hours)     ** No results found for the last 24 hours. **            EKG:     ECG/EMG Results (most recent)     None           Medications:     citalopram 20 mg Oral Daily   nicotine 1 patch Transdermal Daily          All medications " reviewed.    Special Precautions: Continue current level of Special Precautions.    Assessment/Diagnosis:   Patient Active Problem List   Diagnosis Code   • MDD (major depressive disorder) F32.9     Treatment Plan: Discharge home today with plan to followup with [facility].    Attestation:   Physician Attestation: I attest that the above note accurately reflects work and decisions made by me.

## 2017-07-18 NOTE — DISCHARGE SUMMARY
"  Date of Discharge:  7/18/2017    Discharge Diagnosis:Active Problems:    MDD (major depressive disorder)        Presenting Problem/History of Present Illness   Kevan Lakhani is a 26 y.o. male who was admitted for complaints of Increased Depression, Suicidal Ideation, Paranoia, and Increased Aggression. Patient states he has been experiencing feelings of depression for the past 5 months however reports these feelings has intensified over the last 2 months. He reports increased anger and agitation and had reportedly punched a wall prior to presenting to [facility] ED for evaluation. Prior to the patient's arrival, he decided this is effecting his family and his life and he needs help before its too late. He admits to having suicidal thoughts recently and shared he could think of all kinds of ways to kill himself. He admits to getting a gun and \"using it\". Patient reports racing thoughts and paranoia thinking that his wife is \"cheating on him\". He states over the past 2 weeks his symptoms have got worse and he has been unable to sleep. He states that he \"goes off on people\" for no reason and that he is just so angry but doesn't know why. He reports his marriage is strained due to his anger and depression stating increased arguing with his wife. His wife told him that if he did not get help that she would leave him. He denies homicidal ideations or hallucinations. He states his appetite has been good. He seems somewhat guarded and vague with some questions. Per note, the patient moved from [place] to [place] 3 months ago and when asked why he relocated, he stated, \"just to get away\".  He also shares that he has crying spells at times. The patient had also previously stated that he believed that he is Bipolar. Screening questions do not reveal a history of true jaime or hypomania.  He denies any previous psychiatric hospitalizations or past diagnosis of mental illness.   No h/o jaime or hypomania based on extensive screening " questions posed during this interview today.    Hospital Course  The patient was admitted to the Adult Psychiatric Unit for crisis intervention, safety, and stabilization of symptoms.  He was started on Celexa 10 mg daily and titrated up to 20 mg daily and tolerated this medication well.  He received individual and group therapy from a masters level therapist.  I also provided him with some supportive therapy during my interactions with him on the unit.  Initially he was very resistant to discussing his problems but eventually opened up to me about the events surrounding his admission.  He gradually developed more insight into the nature of his anxiety, impulsive behaviors, and difficulties with anger management.  He worked together with our therapist to develop a plan for outpatient follow-up care.  By hospital day's 3 and 4 he was requesting to leave.  On hospital day 4 he was not meeting criteria for a hold against his will.  He was discharged with the plan to follow up with [facility].            Consults:   Consults     No orders found from 6/15/2017 to 7/15/2017.            Condition on Discharge:  improved    Vital Signs  Temp:  [98 °F (36.7 °C)-98.6 °F (37 °C)] 98 °F (36.7 °C)  Heart Rate:  [52-57] 52  Resp:  [18] 18  BP: (122-149)/(79-89) 122/79      Discharge Disposition  Home or Self Care    Discharge Medications   Kar Kevan   Home Medication Instructions SHIVANI:[id number]    Printed on:07/18/17 0919   Medication Information                      citalopram (CeleXA) 20 MG tablet  Take 1 tablet by mouth Daily.             traZODone (DESYREL) 50 MG tablet  Take 1 tablet by mouth At Night As Needed for Sleep.                 Discharge Diet:  regular    Activity at Discharge:  as tolerated    Follow-up Appointments   follow-up with [facility]       Mayco Degroot MD  07/18/17  9:33 AM

## 2017-07-18 NOTE — PROGRESS NOTES
Therapist met with patient on this date to discuss patient discharge. Patient reports feeling safe to discharge home on this date. He reports he will be transported by his mother's boyfriend. Patient will follow up with [facility] for outpatient treatment.

## 2017-07-18 NOTE — PLAN OF CARE
Problem: BH Patient Care Overview (Adult)  Goal: Plan of Care Review  Outcome: Ongoing (interventions implemented as appropriate)    07/18/17 0914   Coping/Psychosocial Response Interventions   Plan Of Care Reviewed With patient   Coping/Psychosocial   Patient Agreement with Plan of Care agrees   Patient Care Overview   Progress improving   Outcome Evaluation   Outcome Summary/Follow up Plan Pt reported no anxiety or depression, No HI/SI with no plan, and no hallucinations. Will observe for changes and will follow up as needed.          Problem:  Overarching Goals  Goal: Adheres to Safety Considerations for Self and Others  Outcome: Ongoing (interventions implemented as appropriate)  Goal: Optimized Coping Skills in Response to Life Stressors  Outcome: Ongoing (interventions implemented as appropriate)  Goal: Develops/Participates in Therapeutic [place] to Support Successful Transition  Outcome: Ongoing (interventions implemented as appropriate)
